# Patient Record
Sex: FEMALE | Race: WHITE | NOT HISPANIC OR LATINO | Employment: OTHER | URBAN - METROPOLITAN AREA
[De-identification: names, ages, dates, MRNs, and addresses within clinical notes are randomized per-mention and may not be internally consistent; named-entity substitution may affect disease eponyms.]

---

## 2017-04-07 ENCOUNTER — SLEEP CENTER VISIT (OUTPATIENT)
Dept: SLEEP MEDICINE | Facility: MEDICAL CENTER | Age: 77
End: 2017-04-07
Payer: MEDICARE

## 2017-04-07 VITALS
OXYGEN SATURATION: 92 % | RESPIRATION RATE: 16 BRPM | HEIGHT: 62 IN | HEART RATE: 76 BPM | WEIGHT: 135 LBS | BODY MASS INDEX: 24.84 KG/M2 | SYSTOLIC BLOOD PRESSURE: 118 MMHG | TEMPERATURE: 97.7 F | DIASTOLIC BLOOD PRESSURE: 72 MMHG

## 2017-04-07 DIAGNOSIS — G47.33 OSA (OBSTRUCTIVE SLEEP APNEA): ICD-10-CM

## 2017-04-07 PROCEDURE — 99213 OFFICE O/P EST LOW 20 MIN: CPT | Performed by: NURSE PRACTITIONER

## 2017-04-07 NOTE — PROGRESS NOTES
"Chief Complaint   Patient presents with   • Apnea     CPAP 9       HPI:  Carol Patel is a 76 y.o. year old female here today for follow-up on her obstructive sleep apnea. She was last seen in the office in October 2016 with Dr. Ben Mantilla. She has a history of known sleep apnea hypopnea with previous polysomnography demonstrating an apnea hypopnea index of 42.4 events per hour and a lowest arterial oxygen saturation of 84% on room air.  She had been successfully treated with CPAP at 10 cm water pressure. She does have a history of systemic arterial hypertension and atrial fibrillation with previous hospitalization for stroke. She is now anticoagulated with Eliquis and is not having bleeding complications related to that medication.  Since the stroke she had felt that her CPAP pressures were \"too high.\"  The pressure was empirically decreased at 8 cm water pending a new polysomnogram to re-titrate therapy. She was recommend a repeat polysomnogram which was performed in October 2016.  It demonstrates severe fragmentation of sleep with reduced sleep efficiency of 57%, wake after sleep onset time totaling 3 hours, and an elevated arousal and awakening index.  CPAP was titrated across a pressure range of 4-12 cm water with persistence of central apnea events.  BiPAP was then titrated across a pressure range of 8-12/4-8 cm water, again with persistence of central apnea events.  Finally Servo adaptive BiPAP ventilation was initiated with good effect.  The final apnea hypopnea index was 6.3 events per hour with a lowest arterial oxygen saturation of 88% on room air.  That stage in the titration included 33 minutes of REM sleep time and sufficient time in the supine body position. She was switched to ASV at her last office visit with an EPAP of 9, min/max PS of 5-16. Her compliance card download today in the office indicates an AHI of 5.5 with an average use of 4 hours at night. Her  states she is doing " much better on this machine. He states it took until February to get her ASV machine. She had mask issues initially, but was switched to a new petite mask. She feels this is more comfortable. She is using her machine longer at night. She feels her pressure is tolerable. Her  has noted that her energy levels have improved. She denies morning headaches.             Past Medical History   Diagnosis Date   • Pain 09-10-13     left leg sciatica, 0/10   • Hypothyroid    • CATARACT      Bilateral; repair to right eye   • Hypertension    • Arthritis    • Hemochromatosis      Bilateral shoulders/hands   • Heart burn    • Sleep apnea      CPAP   • Constipation    • Congestive heart failure (CMS-HCC)    • A-fib (CMS-HCC)        Past Surgical History   Procedure Laterality Date   • Cataract extraction with iol  1970's     left   • Hysterectomy, vaginal  1960   • Tonsillectomy  as child   • Knee arthroscopy  2007     right   • Knee unicompartmental  8/26/2009     Performed by CAROL CRAFT at SURGERY [facility] ORS   • Breast biopsy  1978     bilateral   • Jessica by laparoscopy  1/9/2012     Performed by GANSER, JOHN H at SURGERY [facility] ORS   • Liver biopsy laparoscopic  1/9/2012     Performed by GANSER, JOHN H at SURGERY [facility] ORS   • Mass excision general  1/9/2012     Performed by GANSER, JOHN H at SURGERY [facility] ORS   • Cataract extraction with iol  2010     right   • Recovery  9/11/2013     Performed by [facility] at [facility] SAME DAY [facility] ORS   • Appendectomy         Family History   Problem Relation Age of Onset   • Cancer Mother        Social History     Social History   • Marital Status:      Spouse Name: N/A   • Number of Children: N/A   • Years of Education: N/A     Occupational History   • Not on file.     Social History Main Topics   • Smoking status: Passive Smoke Exposure - Never Smoker   • Smokeless tobacco: Never Used   • Alcohol Use: 0.0 oz/week     0  Standard drinks or equivalent per week      Comment: 2 per week   • Drug Use: No   • Sexual Activity: Not on file     Other Topics Concern   • Not on file     Social History Narrative         ROS:  Constitutional: Denies fevers, chills, sweats, fatigue, weight loss  Eyes: Denies glasses, vision loss, pain, drainage, double vision  Ears/Nose/Mouth/Throat: Denies rhinitis, nasal congestion, ear ache, difficulty hearing, sore throat, persistent hoarseness, decayed teeth/toothache  Cardiovascular: Denies chest pain, tightness, palpitations, swelling in feet/legs, fainting, difficulty breathing when laying down  Respiratory: Denies shortness of breath, cough, sputum, wheezing, painful breathing, coughing up blood  GI: Denies heartburn, nausea, vomiting, abdominal pain, diarrhea, constipation. Positive for dysphagia, followed by speech pathology  : Denies frequent urination, painful urination  Integumentary: Denies rashes, lumps or color changes  MSK: Denies painful joints, sore muscles, and back pain.   Neurological: Denies frequent headaches, dizziness, weakness  Sleep: See HPI       Current Outpatient Prescriptions on File Prior to Visit   Medication Sig Dispense Refill   • levothyroxine (SYNTHROID) 100 MCG Tab Take 100 mcg by mouth Every morning on an empty stomach.     • zolpidem (AMBIEN) 5 MG Tab Take 1 Tab by mouth at bedtime as needed for Sleep (1 to 3 po qhs prn insomnia/sleep study. Bring to sleep study.). 3 Tab 0   • hydrocodone-acetaminophen (NORCO) 5-325 MG Tab per tablet Take 1-2 Tabs by mouth every four hours as needed.     • metoprolol SR (TOPROL XL) 50 MG TABLET SR 24 HR Take 1 Tab by mouth every day. 30 Tab 11   • apixaban (ELIQUIS) 5mg Tab Take 1 Tab by mouth 2 Times a Day. 180 Tab 3   • hydrochlorothiazide (HYDRODIURIL) 25 MG Tab Take 1 Tab by mouth every day. 90 Tab 3   • tramadol (ULTRAM) 50 MG Tab Take one daily 30 Tab 0   • atorvastatin (LIPITOR) 40 MG Tab Take 1 Tab by mouth every bedtime. 30  "Tab 0   • losartan (COZAAR) 100 MG Tab Take 1 Tab by mouth every day. 30 Tab 0   • magnesium chloride SR (SLO-MAG) 535 (64 MG) MG Tab CR Take 1 Tab by mouth 2 Times a Day. 60 Tab 0   • potassium chloride SA (KLOR-CON M15) 15 MEQ tablet Take 2 Tabs by mouth every day. 60 Tab 0   • levothyroxine (SYNTHROID) 112 MCG Tab Take 1 Tab by mouth Every morning on an empty stomach. 30 Tab 0   • omeprazole (PRILOSEC) 20 MG delayed-release capsule Take 1 Cap by mouth 2 Times a Day. 30 Cap 0     No current facility-administered medications on file prior to visit.     Bloodless; Percocet; Codeine; Darvon; Morphine; and Vicodin    Blood pressure 118/72, pulse 76, temperature 36.5 °C (97.7 °F), resp. rate 16, height 1.575 m (5' 2.01\"), weight 61.236 kg (135 lb), SpO2 92 %.  PE:   Appearance: Well developed, well nourished, no acute distress  Eyes: PERRL, EOM intact, sclera white, conjunctiva moist  Ears: no lesions or deformities  Hearing: grossly intact  Nose: no lesions or deformities  Oropharynx: tongue normal, posterior pharynx without erythema or exudate  Mallampati Classification: class 3   Neck: supple, trachea midline, no masses   Respiratory effort: no intercostal retractions or use of accessory muscles  Lung auscultation: no rales, rhonchi or wheezes  Heart auscultation: no murmur rub or gallop  Extremities: no cyanosis or edema  Abdomen: soft ,non tender, no masses  Gait and Station: normal  Digits and nails: no clubbing, cyanosis, petechiae or nodes.  Cranial nerves: grossly intact  Skin: no rashes, lesions or ulcers noted  Orientation: Oriented to time, person and place  Mood and affect: mood and affect appropriate, normal interaction with examiner  Judgement: Intact          Assessment:  1. AISHWARYA (obstructive sleep apnea)      Complex on ASV          Plan:    1) Continue ASV at current pressures. Encouraged increased time spent on therapy.   2) Sleep hygiene discussed. She does have home care coming in and her  " states she is participating in physical therapy.   3) Her  states she had a hospitalization at [facility] for aspiration pneumonia. She spent time in a rehab facility after discharged. She is home now and doing well. She did have dysphagia and is working with Speech pathology.   4) Follow up in 6 months, sooner if needed.

## 2017-04-07 NOTE — PATIENT INSTRUCTIONS
Plan:    1) Continue ASV at current pressures. Encouraged increased time spent on therapy.   2) Sleep hygiene discussed. She does have home care coming in and her  states she is participating in physical therapy.   3) Her  states she had a hospitalization at [facility] for aspiration pneumonia. She spent time in a rehab facility after discharged. She is home now and doing well. She did have dysphagia and is working with Speech pathology.   4) Follow up in 6 months, sooner if needed.

## 2017-04-07 NOTE — MR AVS SNAPSHOT
"        Carol Patel   2017 1:40 PM   Sleep Center Visit   MRN: [id number]    Department:  [facility]   Dept Phone:  [phone]    Description:  Female : 1940   Provider:  MARCELA Whitley           Reason for Visit     Apnea CPAP 9      Allergies as of 2017     Allergen Noted Reactions    Bloodless 2009       Percocet [Oxycodone-Acetaminophen] 2009   Vomiting    Codeine 2009   Vomiting    Darvon [Propoxyphene Hcl] 2009   Vomiting    Morphine 09/10/2013   Vomiting    Vicodin [Hydrocodone-Acetaminophen] 2009   Itching      You were diagnosed with     AISHWARYA (obstructive sleep apnea)   [119460]   Complex on ASV       Vital Signs     Blood Pressure Pulse Temperature Respirations Height Weight    118/72 mmHg 76 36.5 °C (97.7 °F) 16 1.575 m (5' 2.01\") 61.236 kg (135 lb)    Body Mass Index Oxygen Saturation Smoking Status             24.69 kg/m2 92% Passive Smoke Exposure - Never Smoker         Basic Information     Date Of Birth Sex Race Ethnicity Preferred Language    1940 Female White Non- English      Your appointments     Oct 09, 2017  1:00 PM   Follow UP with MARCELA Whitley   [facility] (--)    [address] [place] [id number] [phone]              Problem List              ICD-10-CM Priority Class Noted - Resolved    Atrial fibrillation (CMS-HCC) I48.91 High  2013 - Present    Chronic anticoagulation Z79.01 Low  2013 - Present    HTN (hypertension) I10 High  2013 - Present    Hypothyroidism E03.9 Low  2013 - Present    GERD (gastroesophageal reflux disease) K21.9 Low  2013 - Present    AISHWARYA (obstructive sleep apnea) G47.33 Low  2013 - Present    Type 2 diabetes mellitus without complication (CMS-HCC) E11.9 Medium  11/10/2014 - Present    Moderate aortic insufficiency I35.1 Medium  3/10/2016 - Present    Stroke (CMS-HCC) I63.9   4/15/2016 - Present   " Seasonal allergic rhinitis J30.2   9/22/2016 - Present      Health Maintenance        Date Due Completion Dates    DIABETES MONOFILAMENT / LE EXAM 2/10/1939 ---    RETINAL SCREENING 8/10/1956 ---    URINE ACR / MICROALBUMIN 8/10/1956 ---    IMM DTaP/Tdap/Td Vaccine (1 - Tdap) 8/10/1957 ---    PAP SMEAR 8/10/1959 ---    MAMMOGRAM 8/10/1978 ---    COLONOSCOPY 8/10/1988 ---    IMM ZOSTER VACCINE 8/10/1998 ---    BONE DENSITY 8/10/2003 ---    IMM PNEUMOCOCCAL 65+ (ADULT) LOW/MEDIUM RISK SERIES (1 of 2 - PCV13) 8/10/2003 ---    FASTING LIPID PROFILE 10/16/2015 10/16/2014    A1C SCREENING 10/20/2016 4/20/2016, 10/16/2014    SERUM CREATININE 4/23/2017 4/23/2016, 4/18/2016, 4/16/2016, 10/16/2014, 9/10/2013, 1/4/2012, 8/29/2009, 8/28/2009, 8/24/2009            Current Immunizations     No immunizations on file.      Below and/or attached are the medications your provider expects you to take. Review all of your home medications and newly ordered medications with your provider and/or pharmacist. Follow medication instructions as directed by your provider and/or pharmacist. Please keep your medication list with you and share with your provider. Update the information when medications are discontinued, doses are changed, or new medications (including over-the-counter products) are added; and carry medication information at all times in the event of emergency situations     Allergies:  BLOODLESS - (reactions not documented)     PERCOCET - Vomiting     CODEINE - Vomiting     DARVON - Vomiting     MORPHINE - Vomiting     VICODIN - Itching               Medications  Valid as of: April 07, 2017 -  3:45 PM    Generic Name Brand Name Tablet Size Instructions for use    Apixaban (Tab) ELIQUIS 5mg Take 1 Tab by mouth 2 Times a Day.        Atorvastatin Calcium (Tab) LIPITOR 40 MG Take 1 Tab by mouth every bedtime.        HydroCHLOROthiazide (Tab) HYDRODIURIL 25 MG Take 1 Tab by mouth every day.        Hydrocodone-Acetaminophen (Tab) NORCO  5-325 MG Take 1-2 Tabs by mouth every four hours as needed.        Levothyroxine Sodium (Tab) SYNTHROID 112 MCG Take 1 Tab by mouth Every morning on an empty stomach.        Levothyroxine Sodium (Tab) SYNTHROID 100 MCG Take 100 mcg by mouth Every morning on an empty stomach.        Losartan Potassium (Tab) COZAAR 100 MG Take 1 Tab by mouth every day.        Magnesium Chloride (Tab CR) SLO- (64 MG) MG Take 1 Tab by mouth 2 Times a Day.        Metoprolol Succinate (TABLET SR 24 HR) TOPROL XL 50 MG Take 1 Tab by mouth every day.        Omeprazole (CAPSULE DELAYED RELEASE) PRILOSEC 20 MG Take 1 Cap by mouth 2 Times a Day.        Potassium Chloride Mariana CR (Tab CR) KLOR-CON M15 15 MEQ Take 2 Tabs by mouth every day.        TraMADol HCl (Tab) ULTRAM 50 MG Take one daily        Zolpidem Tartrate (Tab) AMBIEN 5 MG Take 1 Tab by mouth at bedtime as needed for Sleep (1 to 3 po qhs prn insomnia/sleep study. Bring to sleep study.).        .                 Medicines prescribed today were sent to:     [id number] [place] - [address] [address] [address]    Phone: [phone] Fax: [phone]    Open 24 Hours?: No      Medication refill instructions:       If your prescription bottle indicates you have medication refills left, it is not necessary to call your provider’s office. Please contact your pharmacy and they will refill your medication.    If your prescription bottle indicates you do not have any refills left, you may request refills at any time through one of the following ways: The online [employer] system (except Urgent Care), by calling your provider’s office, or by asking your pharmacy to contact your provider’s office with a refill request. Medication refills are processed only during regular business hours and may not be available until the next business day. Your provider may request additional information or to have a follow-up visit with you prior to refilling your medication.     *Please Note: Medication refills are assigned a new Rx number when refilled electronically. Your pharmacy may indicate that no refills were authorized even though a new prescription for the same medication is available at the pharmacy. Please request the medicine by name with the pharmacy before contacting your provider for a refill.        Instructions      Plan:    1) Continue ASV at current pressures. Encouraged increased time spent on therapy.   2) Sleep hygiene discussed. She does have home care coming in and her  states she is participating in physical therapy.   3) Her  states she had a hospitalization at [facility] for aspiration pneumonia. She spent time in a rehab facility after discharged. She is home now and doing well. She did have dysphagia and is working with Speech pathology.   4) Follow up in 6 months, sooner if needed.           MyChart Status: Patient Declined

## 2017-04-13 ENCOUNTER — TELEPHONE (OUTPATIENT)
Dept: PULMONOLOGY | Facility: HOSPICE | Age: 77
End: 2017-04-13

## 2017-10-10 ENCOUNTER — SLEEP CENTER VISIT (OUTPATIENT)
Dept: SLEEP MEDICINE | Facility: MEDICAL CENTER | Age: 77
End: 2017-10-10
Payer: MEDICARE

## 2017-10-10 VITALS
RESPIRATION RATE: 16 BRPM | DIASTOLIC BLOOD PRESSURE: 82 MMHG | HEART RATE: 73 BPM | TEMPERATURE: 98.6 F | SYSTOLIC BLOOD PRESSURE: 122 MMHG | OXYGEN SATURATION: 95 %

## 2017-10-10 DIAGNOSIS — G47.33 OSA (OBSTRUCTIVE SLEEP APNEA): ICD-10-CM

## 2017-10-10 PROCEDURE — 99213 OFFICE O/P EST LOW 20 MIN: CPT | Performed by: NURSE PRACTITIONER

## 2017-10-10 NOTE — PATIENT INSTRUCTIONS
Plan:    1) Continue ASV at current pressures. Order for new mask and supplies sent to [facility].   2) Sleep hygiene discussed. She does take an afternoon nap.    3) Continue to follow with Cardiology. Request consult records from Dr. Santana.   4) Follow up in 6 months with compliance card download, sooner if needed.

## 2017-10-10 NOTE — PROGRESS NOTES
"Chief Complaint   Patient presents with   • Apnea       HPI:  Carol Patel is a 77 y.o. year old female here today for follow-up on her obstructive sleep apnea. She has a history of known sleep apnea hypopnea with previous polysomnography demonstrating an apnea hypopnea index of 42.4 events per hour and a lowest arterial oxygen saturation of 84% on room air.  She had been successfully treated with CPAP at 10 cm water pressure. She does have a history of systemic arterial hypertension and atrial fibrillation with previous hospitalization for stroke. She is now anticoagulated with Eliquis and is not having bleeding complications related to that medication. Since the stroke she had felt that her CPAP pressures were \"too high.\"  The pressure was empirically decreased at 8 cm water pending a new polysomnogram to re-titrate therapy. She was recommend a repeat polysomnogram which was performed in October 2016.  It demonstrates severe fragmentation of sleep with reduced sleep efficiency of 57%, wake after sleep onset time totaling 3 hours, and an elevated arousal and awakening index.  CPAP was titrated across a pressure range of 4-12 cm water with persistence of central apnea events.  BiPAP was then titrated across a pressure range of 8-12/4-8 cm water, again with persistence of central apnea events.  Finally Servo adaptive BiPAP ventilation was initiated with good effect.  The final apnea hypopnea index was 6.3 events per hour with a lowest arterial oxygen saturation of 88% on room air.  That stage in the titration included 33 minutes of REM sleep time and sufficient time in the supine body position. She was switched to ASV with an EPAP of 9, min/max PS of 5-16. Her compliance card download today in the office indicates an AHI of 0.7 with an average use of 6.5 hours at night. Her  feels she is doing much better on this machine. She feels her pressure is tolerable. She feels she is sleeping better on therapy " and wakes more refreshed. She denies morning headaches.   She had some missed days this past month. Her  states her missed days were related to a hospitalization for syncope. She required pacemaker placement. She is followed outpatient with Dr. Santana, Cardiology.     Past Medical History:   Diagnosis Date   • Pain 09-10-13    left leg sciatica, 0/10   • A-fib (CMS-HCC)    • Arthritis    • CATARACT     Bilateral; repair to right eye   • Congestive heart failure (CMS-HCC)    • Constipation    • Heart burn    • Hemochromatosis     Bilateral shoulders/hands   • Hypertension    • Hypothyroid    • Sleep apnea     CPAP       Past Surgical History:   Procedure Laterality Date   • RECOVERY  9/11/2013    Performed by Cath-Recovery Surgery at SURGERY SAME DAY [facility]   • LOREN BY LAPAROSCOPY  1/9/2012    Performed by GANSER, JOHN H at SURGERY [facility] ORS   • LIVER BIOPSY LAPAROSCOPIC  1/9/2012    Performed by GANSER, JOHN H at [facility] ORS   • MASS EXCISION GENERAL  1/9/2012    Performed by GANSER, JOHN H at [facility] ORS   • CATARACT EXTRACTION WITH IOL  2010    right   • KNEE UNICOMPARTMENTAL  8/26/2009    Performed by CAROL CRAFT at [facility] ORS   • KNEE ARTHROSCOPY  2007    right   • BREAST BIOPSY  1978    bilateral   • CATARACT EXTRACTION WITH IOL  1970's    left   • HYSTERECTOMY, VAGINAL  1960   • APPENDECTOMY     • TONSILLECTOMY  as child       Family History   Problem Relation Age of Onset   • Cancer Mother        Social History     Social History   • Marital status:      Spouse name: N/A   • Number of children: N/A   • Years of education: N/A     Occupational History   • Not on file.     Social History Main Topics   • Smoking status: Passive Smoke Exposure - Never Smoker   • Smokeless tobacco: Never Used   • Alcohol use 0.0 oz/week      Comment: 2 per week   • Drug use: No   • Sexual activity: Not on file     Other Topics Concern   • Not on file      Social History Narrative   • No narrative on file           ROS:  Constitutional: Denies fevers, chills, sweats, weight loss  Eyes: Denies glasses, vision loss, pain, drainage, double vision  Ears/Nose/Mouth/Throat: Denies rhinitis, nasal congestion, ear ache, difficulty hearing, sore throat, persistent hoarseness, decayed teeth/toothache  Cardiovascular: Denies chest pain, tightness, palpitations, swelling in feet/legs, fainting, difficulty breathing when laying down  Respiratory: Denies shortness of breath, cough, sputum, wheezing, painful breathing, coughing up blood  GI: Denies heartburn, difficulty swallowing, nausea, vomiting, abdominal pain, diarrhea, constipation  : Denies frequent urination, painful urination  Integumentary: Denies rashes, lumps or color changes  MSK: Denies painful joints, sore muscles, and back pain.   Neurological: Denies frequent headaches, dizziness, weakness  Sleep: See HPI     Current Outpatient Prescriptions   Medication Sig Dispense Refill   • Acetaminophen (TYLENOL PO) Take  by mouth.     • Bimatoprost (LUMIGAN OP) by Ophthalmic route.     • metoprolol SR (TOPROL XL) 50 MG TABLET SR 24 HR Take 1 Tab by mouth every day. 30 Tab 11   • apixaban (ELIQUIS) 5mg Tab Take 1 Tab by mouth 2 Times a Day. 180 Tab 3   • atorvastatin (LIPITOR) 40 MG Tab Take 1 Tab by mouth every bedtime. 30 Tab 0   • magnesium chloride SR (SLO-MAG) 535 (64 MG) MG Tab CR Take 1 Tab by mouth 2 Times a Day. 60 Tab 0   • potassium chloride SA (KLOR-CON M15) 15 MEQ tablet Take 2 Tabs by mouth every day. 60 Tab 0   • levothyroxine (SYNTHROID) 112 MCG Tab Take 1 Tab by mouth Every morning on an empty stomach. 30 Tab 0   • levothyroxine (SYNTHROID) 100 MCG Tab Take 100 mcg by mouth Every morning on an empty stomach.     • zolpidem (AMBIEN) 5 MG Tab Take 1 Tab by mouth at bedtime as needed for Sleep (1 to 3 po qhs prn insomnia/sleep study. Bring to sleep study.). 3 Tab 0   • hydrocodone-acetaminophen (NORCO) 5-325  MG Tab per tablet Take 1-2 Tabs by mouth every four hours as needed.     • hydrochlorothiazide (HYDRODIURIL) 25 MG Tab Take 1 Tab by mouth every day. 90 Tab 3   • tramadol (ULTRAM) 50 MG Tab Take one daily 30 Tab 0   • losartan (COZAAR) 100 MG Tab Take 1 Tab by mouth every day. 30 Tab 0   • omeprazole (PRILOSEC) 20 MG delayed-release capsule Take 1 Cap by mouth 2 Times a Day. 30 Cap 0     No current facility-administered medications for this visit.        Allergies   Allergen Reactions   • Bloodless    • Percocet [Oxycodone-Acetaminophen] Vomiting   • Codeine Vomiting   • Darvon [Propoxyphene Hcl] Vomiting   • Morphine Vomiting   • Vicodin [Hydrocodone-Acetaminophen] Itching       Blood pressure 122/82, pulse 73, temperature 37 °C (98.6 °F), resp. rate 16, SpO2 95 %.    PE:   Appearance: Well developed, well nourished, no acute distress  Eyes: PERRL, EOM intact, sclera white, conjunctiva moist  Ears: no lesions or deformities  Hearing: grossly intact  Nose: no lesions or deformities  Oropharynx: tongue normal, posterior pharynx without erythema or exudate  Mallampati Classification: class 3  Neck: supple, trachea midline, no masses   Respiratory effort: no intercostal retractions or use of accessory muscles  Lung auscultation: no rales, rhonchi or wheezes  Heart auscultation: no murmur rub or gallop  Extremities: no cyanosis or edema  Abdomen: soft ,non tender, no masses  Gait and Station: normal  Digits and nails: no clubbing, cyanosis, petechiae or nodes.  Cranial nerves: grossly intact  Skin: no rashes, lesions or ulcers noted  Orientation: Oriented to time, person and place  Mood and affect: mood and affect appropriate, normal interaction with examiner  Judgement: Intact          Assessment:  1. AISHWARYA (obstructive sleep apnea)           Plan:    1) Continue ASV at current pressures. Order for new mask and supplies sent to [facility].   2) Sleep hygiene discussed. She does take an afternoon nap.    3) Continue to  follow with Cardiology. Request consult records from Dr. Santana.   4) Follow up in 6 months with compliance card download, sooner if needed.

## 2018-04-10 ENCOUNTER — SLEEP CENTER VISIT (OUTPATIENT)
Dept: SLEEP MEDICINE | Facility: MEDICAL CENTER | Age: 78
End: 2018-04-10
Payer: MEDICARE

## 2018-04-10 VITALS
RESPIRATION RATE: 15 BRPM | SYSTOLIC BLOOD PRESSURE: 138 MMHG | HEART RATE: 68 BPM | OXYGEN SATURATION: 97 % | WEIGHT: 132 LBS | BODY MASS INDEX: 24.29 KG/M2 | DIASTOLIC BLOOD PRESSURE: 80 MMHG | HEIGHT: 62 IN

## 2018-04-10 DIAGNOSIS — G47.33 OSA (OBSTRUCTIVE SLEEP APNEA): ICD-10-CM

## 2018-04-10 PROCEDURE — 99213 OFFICE O/P EST LOW 20 MIN: CPT | Performed by: NURSE PRACTITIONER

## 2018-04-10 NOTE — PATIENT INSTRUCTIONS
Plan:    1) Continue ASV @ 9/5/16 CM H20. She appears to have a significant mask leak. Our sleep technician helped with her mask adjustments to ensure proper fit and seal. RX for new mask and supplies sent to her DME.   2) Sleep hygiene discussed.   3) Continue to follow with Cardiology.   4) Follow up in 6 months with repeat compliance card download, sooner if needed.

## 2018-04-10 NOTE — PROGRESS NOTES
"Chief Complaint   Patient presents with   • Follow-Up   • Apnea       HPI:  Carol Patel is a 77 y.o. year old female here today for follow-up on her obstructive sleep apnea. She has a history of known sleep apnea hypopnea with previous polysomnography demonstrating an apnea hypopnea index of 42.4 events per hour and a lowest arterial oxygen saturation of 84% on room air. She had been successfully treated with CPAP at 10 cm water pressure. She does have a history of systemic arterial hypertension and atrial fibrillation with previous hospitalization for stroke. She is now anticoagulated with Eliquis. She also had a pacemaker placement. She had felt her CPAP pressures were \"too high\" after the stroke.  She was empirically decreased at 8 CM H20 and recommended a repeat titration study which was performed in October 2016.  It demonstrated severe fragmentation of sleep with reduced sleep efficiency of 57%, wake after sleep onset time totaling 3 hours, and an elevated arousal and awakening index.  CPAP was titrated across a pressure range of 4-12 cm water with persistence of central apnea events.  BiPAP was then titrated across a pressure range of 8-12/4-8 cm water, again with persistence of central apnea events.  Finally Servo adaptive BiPAP ventilation was initiated with good effect.  The final apnea hypopnea index was 6.3 events per hour with a lowest arterial oxygen saturation of 88% on room air.  That stage in the titration included 33 minutes of REM sleep time and sufficient time in the supine body position. She was switched to ASV with an EPAP of 9, min/max PS of 5-16. Her compliance card download today in the office indicates an AHI of 5.7 with an average use of over 6 hours at night. She does have evidence of a mask leak. She has a full face mask. She does note frequent mask leaks. She does feel she is doing better on ASV. She feels her pressure is tolerable. She feels she is sleeping better on therapy " and wakes more refreshed. She denies morning headaches.         Past Medical History:   Diagnosis Date   • A-fib (CMS-HCC)    • Arthritis    • CATARACT     Bilateral; repair to right eye   • Congestive heart failure (CMS-HCC)    • Constipation    • Heart burn    • Hemochromatosis     Bilateral shoulders/hands   • Hypertension    • Hypothyroid    • Pain 09-10-13    left leg sciatica, 0/10   • Sleep apnea     CPAP       Past Surgical History:   Procedure Laterality Date   • RECOVERY  9/11/2013    Performed by Cath-Recovery Surgery at SURGERY SAME DAY [facility]   • LOREN BY LAPAROSCOPY  1/9/2012    Performed by GANSER, JOHN H at SURGERY [facility] ORS   • LIVER BIOPSY LAPAROSCOPIC  1/9/2012    Performed by GANSER, JOHN H at [facility] ORS   • MASS EXCISION GENERAL  1/9/2012    Performed by GANSER, JOHN H at [facility] ORS   • CATARACT EXTRACTION WITH IOL  2010    right   • KNEE UNICOMPARTMENTAL  8/26/2009    Performed by CAROL CRAFT at SURGERY [facility] ORS   • KNEE ARTHROSCOPY  2007    right   • BREAST BIOPSY  1978    bilateral   • CATARACT EXTRACTION WITH IOL  1970's    left   • HYSTERECTOMY, VAGINAL  1960   • APPENDECTOMY     • TONSILLECTOMY  as child       Family History   Problem Relation Age of Onset   • Cancer Mother        Social History     Social History   • Marital status:      Spouse name: N/A   • Number of children: N/A   • Years of education: N/A     Occupational History   • Not on file.     Social History Main Topics   • Smoking status: Passive Smoke Exposure - Never Smoker   • Smokeless tobacco: Never Used   • Alcohol use 0.0 oz/week      Comment: 2 per week   • Drug use: No   • Sexual activity: Not on file     Other Topics Concern   • Not on file     Social History Narrative   • No narrative on file           ROS:  Constitutional: Denies fevers, chills, sweats, weight loss  Eyes: Denies glasses, vision loss, pain, drainage, double  vision  Ears/Nose/Mouth/Throat: Denies rhinitis, nasal congestion, ear ache, difficulty hearing, sore throat, persistent hoarseness, decayed teeth/toothache  Cardiovascular: Denies chest pain, tightness, palpitations, swelling in feet/legs, fainting, difficulty breathing when laying down  Respiratory: Denies shortness of breath, cough, sputum, wheezing, painful breathing, coughing up blood  GI: Denies heartburn, difficulty swallowing, nausea, vomiting, abdominal pain, diarrhea, constipation  : Denies frequent urination, painful urination  Integumentary: Denies rashes, lumps or color changes  MSK: Denies painful joints, sore muscles, and back pain.   Neurological: Denies frequent headaches, dizziness, weakness  Sleep: See HPI       Current Outpatient Prescriptions   Medication Sig Dispense Refill   • Acetaminophen (TYLENOL PO) Take  by mouth.     • Bimatoprost (LUMIGAN OP) by Ophthalmic route.     • metoprolol SR (TOPROL XL) 50 MG TABLET SR 24 HR Take 1 Tab by mouth every day. 30 Tab 11   • apixaban (ELIQUIS) 5mg Tab Take 1 Tab by mouth 2 Times a Day. 180 Tab 3   • atorvastatin (LIPITOR) 40 MG Tab Take 1 Tab by mouth every bedtime. 30 Tab 0   • magnesium chloride SR (SLO-MAG) 535 (64 MG) MG Tab CR Take 1 Tab by mouth 2 Times a Day. 60 Tab 0   • potassium chloride SA (KLOR-CON M15) 15 MEQ tablet Take 2 Tabs by mouth every day. 60 Tab 0   • levothyroxine (SYNTHROID) 112 MCG Tab Take 1 Tab by mouth Every morning on an empty stomach. 30 Tab 0   • levothyroxine (SYNTHROID) 100 MCG Tab Take 100 mcg by mouth Every morning on an empty stomach.     • zolpidem (AMBIEN) 5 MG Tab Take 1 Tab by mouth at bedtime as needed for Sleep (1 to 3 po qhs prn insomnia/sleep study. Bring to sleep study.). 3 Tab 0   • hydrocodone-acetaminophen (NORCO) 5-325 MG Tab per tablet Take 1-2 Tabs by mouth every four hours as needed.     • hydrochlorothiazide (HYDRODIURIL) 25 MG Tab Take 1 Tab by mouth every day. 90 Tab 3   • tramadol (ULTRAM) 50  "MG Tab Take one daily 30 Tab 0   • losartan (COZAAR) 100 MG Tab Take 1 Tab by mouth every day. 30 Tab 0   • omeprazole (PRILOSEC) 20 MG delayed-release capsule Take 1 Cap by mouth 2 Times a Day. 30 Cap 0     No current facility-administered medications for this visit.        Allergies   Allergen Reactions   • Bloodless    • Percocet [Oxycodone-Acetaminophen] Vomiting   • Codeine Vomiting   • Darvon [Propoxyphene Hcl] Vomiting   • Morphine Vomiting   • Vicodin [Hydrocodone-Acetaminophen] Itching       Blood pressure 138/80, pulse 68, resp. rate 15, height 1.575 m (5' 2\"), weight 59.9 kg (132 lb), SpO2 97 %.    PE:   Appearance: Well developed, well nourished, no acute distress  Eyes: PERRL, EOM intact, sclera white, conjunctiva moist  Ears: no lesions or deformities  Hearing: grossly intact  Nose: no lesions or deformities  Oropharynx: tongue normal, posterior pharynx without erythema or exudate  Mallampati Classification: Class 3  Neck: supple, trachea midline, no masses   Respiratory effort: no intercostal retractions or use of accessory muscles  Lung auscultation: no rales, rhonchi or wheezes  Heart auscultation: no murmur rub or gallop  Extremities: no cyanosis or edema  Abdomen: soft ,non tender, no masses  Gait and Station: normal  Digits and nails: no clubbing, cyanosis, petechiae or nodes.  Cranial nerves: grossly intact  Skin: no rashes, lesions or ulcers noted  Orientation: Oriented to time, person and place  Mood and affect: mood and affect appropriate, normal interaction with examiner  Judgement: Intact          Assessment:  1. AISHWARYA (obstructive sleep apnea)  DME MASK AND SUPPLIES         Plan:    1) Continue ASV @ 9/5/16 CM H20. She appears to have a significant mask leak. Our sleep technician helped with her mask adjustments to ensure proper fit and seal. RX for new mask and supplies sent to her DME.   2) Sleep hygiene discussed.   3) Continue to follow with Cardiology.   4) Follow up in 6 months with " repeat compliance card download, sooner if needed.

## 2018-04-27 ENCOUNTER — TELEPHONE (OUTPATIENT)
Dept: SLEEP MEDICINE | Facility: MEDICAL CENTER | Age: 78
End: 2018-04-27

## 2018-04-27 NOTE — TELEPHONE ENCOUNTER
Per [facility] the patient needs a new ASV titration for 2 reasons,  1. She was not compliant during the 1st 3 month period she was only at 50% usage.  2. The titration states insufficient ASV titration and her notes all say AISHWARYA not CSA.  She will need a new SS and a new results visit documenting CSA. The only note that states CSA is 11/13/16 from Dr. Mantilla, all the others say AISHWARYA.  I am going to call the patient explaining this and get her set up for a new titration study.

## 2018-05-03 NOTE — TELEPHONE ENCOUNTER
[facility] has never provided anything for this patient  They declined when we sent them an order 10/2017 because [facility] [employer] never finished billing the machine after the first 90 days.  They are assuming it was due to non compliance.  I asked that after this much time if a titration study is really what would fix this but Callie stated if she wanted to establish with them - she would actually need to start over from scratch with a new dx sleep study and titration that shows failure of CPAP and BiPAP again.    Patient obviously has been getting supplies through [facility] so she can continue getting them through them but Callie states there is no billing activity in Medicare so they are not getting paid.    Patient would not need a new OV - we would just need to have [facility] add an addendum that stated patient needs to repeat all sleep studies to become complaint for Medicare billing

## 2018-10-15 ENCOUNTER — SLEEP CENTER VISIT (OUTPATIENT)
Dept: SLEEP MEDICINE | Facility: MEDICAL CENTER | Age: 78
End: 2018-10-15
Payer: MEDICARE

## 2018-10-15 VITALS
HEART RATE: 70 BPM | OXYGEN SATURATION: 99 % | WEIGHT: 136 LBS | RESPIRATION RATE: 16 BRPM | SYSTOLIC BLOOD PRESSURE: 112 MMHG | HEIGHT: 62 IN | BODY MASS INDEX: 25.03 KG/M2 | DIASTOLIC BLOOD PRESSURE: 66 MMHG | TEMPERATURE: 98.2 F

## 2018-10-15 DIAGNOSIS — G47.33 OSA (OBSTRUCTIVE SLEEP APNEA): ICD-10-CM

## 2018-10-15 DIAGNOSIS — G47.31 CENTRAL SLEEP APNEA: ICD-10-CM

## 2018-10-15 PROCEDURE — 99213 OFFICE O/P EST LOW 20 MIN: CPT | Performed by: NURSE PRACTITIONER

## 2018-10-15 NOTE — PROGRESS NOTES
"Chief Complaint   Patient presents with   • Apnea     last seen 4/10/18        HPI:  Carol Patel is a 78 y.o. year old female here today for follow-up on her obstructive sleep apnea. She has a history of known sleep apnea hypopnea with previous polysomnography demonstrating an apnea hypopnea index of 42.4 events per hour and a lowest arterial oxygen saturation of 84% on room air. She had been successfully treated with CPAP at 10 cm water pressure. She does have a history of systemic arterial hypertension and atrial fibrillation with previous hospitalization for stroke. She is anticoagulated with Eliquis. She has a history of a pacemaker placement. She had felt her CPAP pressures were \"too high\" after the stroke.  She was empirically decreased at 8 CM H20 and recommended a repeat titration study which was performed in October 2016.  It demonstrated severe fragmentation of sleep with reduced sleep efficiency of 57%, wake after sleep onset time totaling 3 hours, and an elevated arousal and awakening index.  CPAP was titrated across a pressure range of 4-12 cm water with persistence of central apnea events.  BiPAP was then titrated across a pressure range of 8-12/4-8 cm water, again with persistence of central apnea events.  Finally Servo adaptive BiPAP ventilation was initiated with good effect.  The final apnea hypopnea index was 6.3 events per hour with a lowest arterial oxygen saturation of 88% on room air.  That stage in the titration included 33 minutes of REM sleep time and sufficient time in the supine body position. She was switched to ASV with an EPAP of 9, min/max PS of 5-16.   Compliance card download today in the office indicates an AHI of 1.2 with an average use of over 7 hours at night. She tolerates the pressure well. She has a full face mask which she feels is comfortable. She does feel she sleeps better on therapy and wakes more refreshed. She denies any morning headaches.       Past Medical " History:   Diagnosis Date   • A-fib (HCC)    • Arthritis    • CATARACT     Bilateral; repair to right eye   • Congestive heart failure (HCC)    • Constipation    • Heart burn    • Hemochromatosis     Bilateral shoulders/hands   • Hypertension    • Hypothyroid    • Pain 09-10-13    left leg sciatica, 0/10   • Sleep apnea     CPAP       Past Surgical History:   Procedure Laterality Date   • RECOVERY  9/11/2013    Performed by [facility]-Recovery Surgery at SURGERY SAME DAY [facility]   • LOREN BY LAPAROSCOPY  1/9/2012    Performed by GANSER, JOHN H at SURGERY [facility] ORS   • LIVER BIOPSY LAPAROSCOPIC  1/9/2012    Performed by GANSER, JOHN H at [facility] ORS   • MASS EXCISION GENERAL  1/9/2012    Performed by GANSER, JOHN H at [facility] ORS   • CATARACT EXTRACTION WITH IOL  2010    right   • KNEE UNICOMPARTMENTAL  8/26/2009    Performed by CAROL CRAFT at [facility]   • KNEE ARTHROSCOPY  2007    right   • BREAST BIOPSY  1978    bilateral   • CATARACT EXTRACTION WITH IOL  1970's    left   • HYSTERECTOMY, VAGINAL  1960   • APPENDECTOMY     • TONSILLECTOMY  as child       Family History   Problem Relation Age of Onset   • Cancer Mother        Social History     Social History   • Marital status:      Spouse name: N/A   • Number of children: N/A   • Years of education: N/A     Occupational History   • Not on file.     Social History Main Topics   • Smoking status: Passive Smoke Exposure - Never Smoker   • Smokeless tobacco: Never Used   • Alcohol use 4.2 oz/week     7 Shots of liquor per week   • Drug use: No   • Sexual activity: Not on file     Other Topics Concern   • Not on file     Social History Narrative   • No narrative on file       ROS:  Constitutional: Denies fevers, chills, sweats, weight loss  Eyes: Denies glasses, vision loss, pain, drainage, double vision  Ears/Nose/Mouth/Throat: Denies rhinitis, nasal congestion, ear ache, difficulty hearing, sore throat,  "persistent hoarseness, decayed teeth/toothache  Cardiovascular: Denies chest pain, tightness, palpitations, swelling in feet/legs, fainting, difficulty breathing when laying down  Respiratory: Denies shortness of breath, cough, sputum, wheezing, painful breathing, coughing up blood  GI: Denies heartburn, difficulty swallowing, nausea, vomiting, abdominal pain, diarrhea, constipation  : Denies frequent urination, painful urination  Integumentary: Denies rashes, lumps or color changes  MSK: Denies painful joints, sore muscles, and back pain.   Neurological: Denies frequent headaches, dizziness, weakness  Sleep: See HPI       Current Outpatient Prescriptions   Medication Sig Dispense Refill   • Acetaminophen (TYLENOL PO) Take  by mouth.     • Bimatoprost (LUMIGAN OP) by Ophthalmic route.     • metoprolol SR (TOPROL XL) 50 MG TABLET SR 24 HR Take 1 Tab by mouth every day. 30 Tab 11   • apixaban (ELIQUIS) 5mg Tab Take 1 Tab by mouth 2 Times a Day. 180 Tab 3   • atorvastatin (LIPITOR) 40 MG Tab Take 1 Tab by mouth every bedtime. 30 Tab 0   • potassium chloride SA (KLOR-CON M15) 15 MEQ tablet Take 2 Tabs by mouth every day. 60 Tab 0   • levothyroxine (SYNTHROID) 112 MCG Tab Take 1 Tab by mouth Every morning on an empty stomach. 30 Tab 0   • levothyroxine (SYNTHROID) 100 MCG Tab Take 100 mcg by mouth Every morning on an empty stomach.     • magnesium chloride SR (SLO-MAG) 535 (64 MG) MG Tab CR Take 1 Tab by mouth 2 Times a Day. 60 Tab 0     No current facility-administered medications for this visit.        Allergies   Allergen Reactions   • Bloodless    • Percocet [Oxycodone-Acetaminophen] Vomiting   • Codeine Vomiting   • Darvon [Propoxyphene Hcl] Vomiting   • Morphine Vomiting   • Vicodin [Hydrocodone-Acetaminophen] Itching       Blood pressure 112/66, pulse 70, temperature 36.8 °C (98.2 °F), temperature source Temporal, resp. rate 16, height 1.575 m (5' 2\"), weight 61.7 kg (136 lb), SpO2 99 %.    PE:   Appearance: " Well developed, well nourished, no acute distress  Eyes: PERRL, EOM intact, sclera white, conjunctiva moist  Ears: no lesions or deformities  Hearing: grossly intact  Nose: no lesions or deformities  Oropharynx: tongue normal, posterior pharynx without erythema or exudate  Mallampati Classification: Class 4   Neck: supple, trachea midline, no masses   Respiratory effort: no intercostal retractions or use of accessory muscles  Lung auscultation: no rales, rhonchi or wheezes  Heart auscultation: no murmur rub or gallop  Extremities: no cyanosis or edema  Abdomen: soft ,non tender, no masses  Gait and Station: normal  Digits and nails: no clubbing, cyanosis, petechiae or nodes.  Cranial nerves: grossly intact  Skin: no rashes, lesions or ulcers noted  Orientation: Oriented to time, person and place  Mood and affect: mood and affect appropriate, normal interaction with examiner  Judgement: Intact          Assessment:    1. AISHWARYA (obstructive sleep apnea)  DME Mask and Supplies   2. Central sleep apnea  DME Mask and Supplies         Plan:    1) Continue ASV @ 9/5/16 CM H20. Change DME to [facility]. Order sent for mask and supplies.   2) Sleep hygiene discussed.   3) Continue to follow with Cardiology.   4) Up to date on Prevnar 13 and Influenza vaccinations. Recommend updated Pneumovax 23 if not completed in the past.   5) Annual follow up with compliance card download, sooner if needed.

## 2018-10-15 NOTE — PATIENT INSTRUCTIONS
Plan:    1) Continue ASV @ 9/5/16 CM H20. Change DME to [facility]. Order sent for mask and supplies.   2) Sleep hygiene discussed.   3) Continue to follow with Cardiology.   4) Up to date on Prevnar 13 and Influenza vaccinations. Recommend updated Pneumovax 23 if not completed in the past.   5) Annual follow up with compliance card download, sooner if needed.

## 2019-05-06 ENCOUNTER — TELEPHONE (OUTPATIENT)
Dept: PULMONOLOGY | Facility: HOSPICE | Age: 79
End: 2019-05-06

## 2019-05-06 DIAGNOSIS — G47.31 CENTRAL SLEEP APNEA: ICD-10-CM

## 2019-05-06 NOTE — TELEPHONE ENCOUNTER
Patients , Bert, called states they have just switched insurance and they now have Prom[employer] and since it is not excepted by [facility] they are requesting a Referral to [facility]. Request was already sent for patients .       Patient was last seen 10/15/18 RONN CORTEZ       Please advise

## 2019-05-06 NOTE — TELEPHONE ENCOUNTER
Patients  and patient have been informed and will be waiting for [facility] to give them a call.

## 2019-05-28 ENCOUNTER — APPOINTMENT (RX ONLY)
Dept: URBAN - METROPOLITAN AREA CLINIC 31 | Facility: CLINIC | Age: 79
Setting detail: DERMATOLOGY
End: 2019-05-28

## 2019-05-28 DIAGNOSIS — L81.4 OTHER MELANIN HYPERPIGMENTATION: ICD-10-CM

## 2019-05-28 DIAGNOSIS — Z71.89 OTHER SPECIFIED COUNSELING: ICD-10-CM

## 2019-05-28 DIAGNOSIS — D18.0 HEMANGIOMA: ICD-10-CM

## 2019-05-28 DIAGNOSIS — L82.1 OTHER SEBORRHEIC KERATOSIS: ICD-10-CM

## 2019-05-28 DIAGNOSIS — D22 MELANOCYTIC NEVI: ICD-10-CM

## 2019-05-28 PROBLEM — D18.01 HEMANGIOMA OF SKIN AND SUBCUTANEOUS TISSUE: Status: ACTIVE | Noted: 2019-05-28

## 2019-05-28 PROBLEM — D22.5 MELANOCYTIC NEVI OF TRUNK: Status: ACTIVE | Noted: 2019-05-28

## 2019-05-28 PROBLEM — D48.5 NEOPLASM OF UNCERTAIN BEHAVIOR OF SKIN: Status: ACTIVE | Noted: 2019-05-28

## 2019-05-28 PROCEDURE — 99202 OFFICE O/P NEW SF 15 MIN: CPT | Mod: 25

## 2019-05-28 PROCEDURE — ? COUNSELING

## 2019-05-28 PROCEDURE — 11102 TANGNTL BX SKIN SINGLE LES: CPT

## 2019-05-28 PROCEDURE — ? BIOPSY BY SHAVE METHOD

## 2019-05-28 ASSESSMENT — LOCATION ZONE DERM
LOCATION ZONE: ARM
LOCATION ZONE: TRUNK

## 2019-05-28 ASSESSMENT — LOCATION SIMPLE DESCRIPTION DERM
LOCATION SIMPLE: LEFT SHOULDER
LOCATION SIMPLE: LEFT UPPER BACK
LOCATION SIMPLE: ABDOMEN
LOCATION SIMPLE: CHEST
LOCATION SIMPLE: RIGHT SHOULDER

## 2019-05-28 ASSESSMENT — LOCATION DETAILED DESCRIPTION DERM
LOCATION DETAILED: EPIGASTRIC SKIN
LOCATION DETAILED: LEFT POSTERIOR SHOULDER
LOCATION DETAILED: RIGHT POSTERIOR SHOULDER
LOCATION DETAILED: LEFT INFERIOR MEDIAL UPPER BACK
LOCATION DETAILED: LEFT MEDIAL SUPERIOR CHEST

## 2019-05-28 NOTE — PROCEDURE: BIOPSY BY SHAVE METHOD
Notification Instructions: Patient will be notified of biopsy results. However, patient instructed to call the office if not contacted within 2 weeks.
Curettage Text: The wound bed was treated with curettage after the biopsy was performed.
Hemostasis: Drysol
Size Of Lesion In Cm: 0.5
Bill For Surgical Tray: no
Lab: [id number]
Consent: Written consent was obtained and risks were reviewed including but not limited to scarring, infection, bleeding, scabbing, incomplete removal, nerve damage and allergy to anesthesia.
Cryotherapy Text: The wound bed was treated with cryotherapy after the biopsy was performed.
Depth Of Biopsy: dermis
Lab Facility: 
Anesthesia Type: 1% lidocaine with epinephrine
Render Post-Care Instructions In Note?: yes
Wound Care: Vaseline
X Size Of Lesion In Cm: 0
Biopsy Type: H and E
Electrodesiccation Text: The wound bed was treated with electrodesiccation after the biopsy was performed.
Type Of Destruction Used: Curettage
Electrodesiccation And Curettage Text: The wound bed was treated with electrodesiccation and curettage after the biopsy was performed.
Billing Type: Third-Party Bill
Biopsy Method: Personna blade
Post-Care Instructions: I reviewed with the patient in detail post-care instructions. Patient is to keep the biopsy site dry overnight, and then apply vaseline twice daily until healed.
Silver Nitrate Text: The wound bed was treated with silver nitrate after the biopsy was performed.
Detail Level: Detailed
Dressing: bandage

## 2020-09-02 ENCOUNTER — APPOINTMENT (RX ONLY)
Dept: URBAN - METROPOLITAN AREA CLINIC 31 | Facility: CLINIC | Age: 80
Setting detail: DERMATOLOGY
End: 2020-09-02

## 2020-09-02 DIAGNOSIS — L82.0 INFLAMED SEBORRHEIC KERATOSIS: ICD-10-CM

## 2020-09-02 DIAGNOSIS — L82.1 OTHER SEBORRHEIC KERATOSIS: ICD-10-CM

## 2020-09-02 PROBLEM — D48.5 NEOPLASM OF UNCERTAIN BEHAVIOR OF SKIN: Status: ACTIVE | Noted: 2020-09-02

## 2020-09-02 PROCEDURE — 99212 OFFICE O/P EST SF 10 MIN: CPT | Mod: 25

## 2020-09-02 PROCEDURE — ? ADDITIONAL NOTES

## 2020-09-02 PROCEDURE — ? OBSERVATION AND MEASURE

## 2020-09-02 PROCEDURE — ? COUNSELING

## 2020-09-02 PROCEDURE — ? BIOPSY BY SHAVE METHOD

## 2020-09-02 PROCEDURE — 11102 TANGNTL BX SKIN SINGLE LES: CPT

## 2020-09-02 ASSESSMENT — LOCATION DETAILED DESCRIPTION DERM
LOCATION DETAILED: LEFT FOREHEAD
LOCATION DETAILED: RIGHT CENTRAL MALAR CHEEK

## 2020-09-02 ASSESSMENT — LOCATION ZONE DERM: LOCATION ZONE: FACE

## 2020-09-02 ASSESSMENT — LOCATION SIMPLE DESCRIPTION DERM
LOCATION SIMPLE: RIGHT CHEEK
LOCATION SIMPLE: LEFT FOREHEAD

## 2020-09-02 NOTE — PROCEDURE: BIOPSY BY SHAVE METHOD
Detail Level: Detailed
Depth Of Biopsy: dermis
Was A Bandage Applied: Yes
Size Of Lesion In Cm: 1.3
X Size Of Lesion In Cm: 0
Biopsy Type: H and E
Biopsy Method: Personna blade
Anesthesia Type: 1% lidocaine with epinephrine
Anesthesia Volume In Cc: 0.5
Hemostasis: Drysol and Electrocautery
Wound Care: Vaseline
Dressing: bandage
Destruction After The Procedure: No
Type Of Destruction Used: Curettage
Curettage Text: The wound bed was treated with curettage after the biopsy was performed.
Cryotherapy Text: The wound bed was treated with cryotherapy after the biopsy was performed.
Electrodesiccation Text: The wound bed was treated with electrodesiccation after the biopsy was performed.
Electrodesiccation And Curettage Text: The wound bed was treated with electrodesiccation and curettage after the biopsy was performed.
Silver Nitrate Text: The wound bed was treated with silver nitrate after the biopsy was performed.
Lab: [id number]
Lab Facility: 
Consent: Written consent was obtained and risks were reviewed including but not limited to scarring, infection, bleeding, scabbing, incomplete removal, nerve damage and allergy to anesthesia.
Post-Care Instructions: I reviewed with the patient in detail post-care instructions. Patient is to keep the biopsy site dry overnight, and then apply vaseline twice daily until healed.
Notification Instructions: Patient will be notified of biopsy results. However, patient instructed to call the office if not contacted within 2 weeks.
Billing Type: Third-Party Bill
Information: Selecting Yes will display possible errors in your note based on the variables you have selected. This validation is only offered as a suggestion for you. PLEASE NOTE THAT THE VALIDATION TEXT WILL BE REMOVED WHEN YOU FINALIZE YOUR NOTE. IF YOU WANT TO FAX A PRELIMINARY NOTE YOU WILL NEED TO TOGGLE THIS TO 'NO' IF YOU DO NOT WANT IT IN YOUR FAXED NOTE.

## 2020-09-02 NOTE — HPI: SKIN LESION
Is This A New Presentation, Or A Follow-Up?: Skin Lesion
How Severe Is Your Skin Lesion?: mild
Has Your Skin Lesion Been Treated?: not been treated
Additional History: Patients  noticed blood on patients pillow case last week.

## 2021-01-14 DIAGNOSIS — Z23 NEED FOR VACCINATION: ICD-10-CM
